# Patient Record
Sex: MALE | Race: BLACK OR AFRICAN AMERICAN | NOT HISPANIC OR LATINO | URBAN - METROPOLITAN AREA
[De-identification: names, ages, dates, MRNs, and addresses within clinical notes are randomized per-mention and may not be internally consistent; named-entity substitution may affect disease eponyms.]

---

## 2021-04-29 ENCOUNTER — EMERGENCY (EMERGENCY)
Facility: HOSPITAL | Age: 41
LOS: 1 days | Discharge: HOME | End: 2021-04-29
Attending: EMERGENCY MEDICINE
Payer: MEDICAID

## 2021-04-29 VITALS
TEMPERATURE: 98 F | SYSTOLIC BLOOD PRESSURE: 109 MMHG | OXYGEN SATURATION: 97 % | RESPIRATION RATE: 18 BRPM | DIASTOLIC BLOOD PRESSURE: 75 MMHG | WEIGHT: 229.94 LBS

## 2021-04-29 LAB
ALBUMIN SERPL ELPH-MCNC: 3.6 G/DL — SIGNIFICANT CHANGE UP (ref 3.5–5)
ALP SERPL-CCNC: 65 U/L — SIGNIFICANT CHANGE UP (ref 40–120)
ALT FLD-CCNC: 33 U/L DA — SIGNIFICANT CHANGE UP (ref 10–60)
ANION GAP SERPL CALC-SCNC: 3 MMOL/L — LOW (ref 5–17)
AST SERPL-CCNC: 24 U/L — SIGNIFICANT CHANGE UP (ref 10–40)
BASOPHILS # BLD AUTO: 0.02 K/UL — SIGNIFICANT CHANGE UP (ref 0–0.2)
BASOPHILS NFR BLD AUTO: 0.5 % — SIGNIFICANT CHANGE UP (ref 0–2)
BILIRUB SERPL-MCNC: 0.3 MG/DL — SIGNIFICANT CHANGE UP (ref 0.2–1.2)
BUN SERPL-MCNC: 10 MG/DL — SIGNIFICANT CHANGE UP (ref 7–18)
CALCIUM SERPL-MCNC: 8.4 MG/DL — SIGNIFICANT CHANGE UP (ref 8.4–10.5)
CHLORIDE SERPL-SCNC: 106 MMOL/L — SIGNIFICANT CHANGE UP (ref 96–108)
CK SERPL-CCNC: 261 U/L — HIGH (ref 35–232)
CO2 SERPL-SCNC: 30 MMOL/L — SIGNIFICANT CHANGE UP (ref 22–31)
CREAT SERPL-MCNC: 0.88 MG/DL — SIGNIFICANT CHANGE UP (ref 0.5–1.3)
EOSINOPHIL # BLD AUTO: 0.13 K/UL — SIGNIFICANT CHANGE UP (ref 0–0.5)
EOSINOPHIL NFR BLD AUTO: 3.4 % — SIGNIFICANT CHANGE UP (ref 0–6)
GLUCOSE SERPL-MCNC: 95 MG/DL — SIGNIFICANT CHANGE UP (ref 70–99)
HCT VFR BLD CALC: 45.1 % — SIGNIFICANT CHANGE UP (ref 39–50)
HGB BLD-MCNC: 15.2 G/DL — SIGNIFICANT CHANGE UP (ref 13–17)
IMM GRANULOCYTES NFR BLD AUTO: 0 % — SIGNIFICANT CHANGE UP (ref 0–1.5)
LIDOCAIN IGE QN: 1115 U/L — HIGH (ref 73–393)
LYMPHOCYTES # BLD AUTO: 1.9 K/UL — SIGNIFICANT CHANGE UP (ref 1–3.3)
LYMPHOCYTES # BLD AUTO: 50.1 % — HIGH (ref 13–44)
MCHC RBC-ENTMCNC: 30.4 PG — SIGNIFICANT CHANGE UP (ref 27–34)
MCHC RBC-ENTMCNC: 33.7 GM/DL — SIGNIFICANT CHANGE UP (ref 32–36)
MCV RBC AUTO: 90.2 FL — SIGNIFICANT CHANGE UP (ref 80–100)
MONOCYTES # BLD AUTO: 0.52 K/UL — SIGNIFICANT CHANGE UP (ref 0–0.9)
MONOCYTES NFR BLD AUTO: 13.7 % — SIGNIFICANT CHANGE UP (ref 2–14)
NEUTROPHILS # BLD AUTO: 1.22 K/UL — LOW (ref 1.8–7.4)
NEUTROPHILS NFR BLD AUTO: 32.3 % — LOW (ref 43–77)
NRBC # BLD: 0 /100 WBCS — SIGNIFICANT CHANGE UP (ref 0–0)
PLATELET # BLD AUTO: 155 K/UL — SIGNIFICANT CHANGE UP (ref 150–400)
POTASSIUM SERPL-MCNC: 4.4 MMOL/L — SIGNIFICANT CHANGE UP (ref 3.5–5.3)
POTASSIUM SERPL-SCNC: 4.4 MMOL/L — SIGNIFICANT CHANGE UP (ref 3.5–5.3)
PROT SERPL-MCNC: 7.2 G/DL — SIGNIFICANT CHANGE UP (ref 6–8.3)
RBC # BLD: 5 M/UL — SIGNIFICANT CHANGE UP (ref 4.2–5.8)
RBC # FLD: 12.6 % — SIGNIFICANT CHANGE UP (ref 10.3–14.5)
SARS-COV-2 RNA SPEC QL NAA+PROBE: SIGNIFICANT CHANGE UP
SODIUM SERPL-SCNC: 139 MMOL/L — SIGNIFICANT CHANGE UP (ref 135–145)
TROPONIN I SERPL-MCNC: <0.015 NG/ML — SIGNIFICANT CHANGE UP (ref 0–0.04)
WBC # BLD: 3.79 K/UL — LOW (ref 3.8–10.5)
WBC # FLD AUTO: 3.79 K/UL — LOW (ref 3.8–10.5)

## 2021-04-29 PROCEDURE — 85025 COMPLETE CBC W/AUTO DIFF WBC: CPT

## 2021-04-29 PROCEDURE — 96374 THER/PROPH/DIAG INJ IV PUSH: CPT

## 2021-04-29 PROCEDURE — 84484 ASSAY OF TROPONIN QUANT: CPT

## 2021-04-29 PROCEDURE — 80053 COMPREHEN METABOLIC PANEL: CPT

## 2021-04-29 PROCEDURE — 99285 EMERGENCY DEPT VISIT HI MDM: CPT

## 2021-04-29 PROCEDURE — 82550 ASSAY OF CK (CPK): CPT

## 2021-04-29 PROCEDURE — 93005 ELECTROCARDIOGRAM TRACING: CPT

## 2021-04-29 PROCEDURE — 71045 X-RAY EXAM CHEST 1 VIEW: CPT

## 2021-04-29 PROCEDURE — 99284 EMERGENCY DEPT VISIT MOD MDM: CPT | Mod: 25

## 2021-04-29 PROCEDURE — 87635 SARS-COV-2 COVID-19 AMP PRB: CPT

## 2021-04-29 PROCEDURE — 82962 GLUCOSE BLOOD TEST: CPT

## 2021-04-29 PROCEDURE — 36415 COLL VENOUS BLD VENIPUNCTURE: CPT

## 2021-04-29 PROCEDURE — 83690 ASSAY OF LIPASE: CPT

## 2021-04-29 PROCEDURE — 71045 X-RAY EXAM CHEST 1 VIEW: CPT | Mod: 26

## 2021-04-29 RX ORDER — SODIUM CHLORIDE 9 MG/ML
1000 INJECTION INTRAMUSCULAR; INTRAVENOUS; SUBCUTANEOUS ONCE
Refills: 0 | Status: COMPLETED | OUTPATIENT
Start: 2021-04-29 | End: 2021-04-29

## 2021-04-29 RX ORDER — METOCLOPRAMIDE HCL 10 MG
10 TABLET ORAL ONCE
Refills: 0 | Status: COMPLETED | OUTPATIENT
Start: 2021-04-29 | End: 2021-04-29

## 2021-04-29 RX ADMIN — SODIUM CHLORIDE 1000 MILLILITER(S): 9 INJECTION INTRAMUSCULAR; INTRAVENOUS; SUBCUTANEOUS at 08:49

## 2021-04-29 RX ADMIN — Medication 104 MILLIGRAM(S): at 08:50

## 2021-04-29 NOTE — ED PROVIDER NOTE - CLINICAL SUMMARY MEDICAL DECISION MAKING FREE TEXT BOX
41 y/o M presents with vague diffuse body complaints. Will do labs, fluids and reassess. Recommended ultrasound for gallstones but patient declined. Patient received liter of fluid and is tolerating PO. Patient requesting discharge, will follow up with outpatient GI.

## 2021-04-29 NOTE — ED PROVIDER NOTE - PATIENT PORTAL LINK FT
You can access the FollowMyHealth Patient Portal offered by [facility] by registering at the following website: [email or web address]. By joining [email or web address]’s FollowMyHealth portal, you will also be able to view your health information using other applications (apps) compatible with our system.

## 2021-04-29 NOTE — ED PROVIDER NOTE - NSFOLLOWUPINSTRUCTIONS_ED_ALL_ED_FT
Followup with a GI specialist. DO not drink alcohol. Retutrn if having abdominal pain or vomiting  Acute Pancreatitis       The pancreas is a gland that is located behind the stomach on the left side of the abdomen. It produces enzymes that help to digest food. The pancreas also releases the hormones glucagon and insulin, which help to regulate blood sugar. Acute pancreatitis happens when inflammation of the pancreas suddenly occurs and the pancreas becomes irritated and swollen.    Most acute attacks last a few days and cause serious problems. Some people become dehydrated and develop low blood pressure. In severe cases, bleeding in the abdomen can lead to shock and can be life-threatening. The lungs, heart, and kidneys may fail.      What are the causes?  This condition may be caused by:  •Alcohol abuse.      •Drug abuse.      •Gallstones or other conditions that can block the tube that drains the pancreas (pancreatic duct).      •A tumor in the pancreas.    Other causes include:  •Certain medicines.      •Exposure to certain chemicals.      •Diabetes.      •An infection in the pancreas.      •Damage caused by an accident (trauma).      •The poison (venom) from a scorpion bite.      •Abdominal surgery.      •Autoimmune pancreatitis. This is when the body's disease-fighting (immune) system attacks the pancreas.      •Genes that are passed from parent to child (inherited).      In some cases, the cause of this condition is not known.      What are the signs or symptoms?  Symptoms of this condition include:  •Pain in the upper abdomen that may radiate to the back. Pain may be severe.      •Tenderness and swelling of the abdomen.      •Nausea and vomiting.      •Fever.        How is this diagnosed?  This condition may be diagnosed based on:  •A physical exam.      •Blood tests.      •Imaging tests, such as X-rays, CT or MRI scans, or an ultrasound of the abdomen.        How is this treated?  Treatment for this condition usually requires a stay in the hospital. Treatment for this condition may include:  •Pain medicine.      •Fluid replacement through an IV.      •Placing a tube in the stomach to remove stomach contents and to control vomiting (NG tube, or nasogastric tube).      •Not eating for 3–4 days. This gives the pancreas a rest, because enzymes are not being produced that can cause further damage.      •Antibiotic medicines, if your condition is caused by an infection.      •Treating any underlying conditions that may be the cause.      •Steroid medicines, if your condition is caused by your immune system attacking your body's own tissues (autoimmune disease).      •Surgery on the pancreas or gallbladder.        Follow these instructions at home:      Eating and drinking      •Follow instructions from your health care provider about diet. This may involve avoiding alcohol and decreasing the amount of fat in your diet.      •Eat smaller, more frequent meals. This reduces the amount of digestive fluids that the pancreas produces.      •Drink enough fluid to keep your urine pale yellow.      • Do not drink alcohol if it caused your condition.      General instructions     •Take over-the-counter and prescription medicines only as told by your health care provider.      • Do not drive or use heavy machinery while taking prescription pain medicine.    •Ask your health care provider if the medicine prescribed to you can cause constipation. You may need to take steps to prevent or treat constipation, such as:  •Take an over-the-counter or prescription medicine for constipation.      •Eat foods that are high in fiber such as whole grains and beans.      •Limit foods that are high in fat and processed sugars, such as fried or sweet foods.        • Do not use any products that contain nicotine or tobacco, such as cigarettes, e-cigarettes, and chewing tobacco. If you need help quitting, ask your health care provider.      •Get plenty of rest.      •If directed, check your blood sugar at home as told by your health care provider.      •Keep all follow-up visits as told by your health care provider. This is important.        Contact a health care provider if you:    •Do not recover as quickly as expected.      •Develop new or worsening symptoms.      •Have persistent pain, weakness, or nausea.      •Recover and then have another episode of pain.      •Have a fever.        Get help right away if:    •You cannot eat or keep fluids down.      •Your pain becomes severe.      •Your skin or the white part of your eyes turns yellow (jaundice).      •You have sudden swelling in your abdomen.      •You vomit.      •You feel dizzy or you faint.      •Your blood sugar is high (over 300 mg/dL).        Summary    •Acute pancreatitis happens when inflammation of the pancreas suddenly occurs and the pancreas becomes irritated and swollen.      •This condition is typically caused by alcohol abuse, drug abuse, or gallstones.      •Treatment for this condition usually requires a stay in the hospital.      This information is not intended to replace advice given to you by your health care provider. Make sure you discuss any questions you have with your health care provider.

## 2021-04-29 NOTE — ED PROVIDER NOTE - OBJECTIVE STATEMENT
41 y/o M presents to the ED with complaints of diffuse body aches including head, chest and abdomen. Patient also notes slight cough for weeks now. Denies fever, vomiting, diarrhea or any other acute complaints. [facility] living [facility]. Patient states "I do not usually drink but I drank a little a few days ago". No abdominal pain currently.